# Patient Record
Sex: MALE | Employment: FULL TIME | URBAN - METROPOLITAN AREA
[De-identification: names, ages, dates, MRNs, and addresses within clinical notes are randomized per-mention and may not be internally consistent; named-entity substitution may affect disease eponyms.]

---

## 2021-09-10 ENCOUNTER — OFFICE VISIT (OUTPATIENT)
Dept: FAMILY MEDICINE CLINIC | Age: 26
End: 2021-09-10
Payer: COMMERCIAL

## 2021-09-10 VITALS
OXYGEN SATURATION: 99 % | TEMPERATURE: 99 F | BODY MASS INDEX: 34.97 KG/M2 | HEART RATE: 82 BPM | DIASTOLIC BLOOD PRESSURE: 76 MMHG | SYSTOLIC BLOOD PRESSURE: 118 MMHG | HEIGHT: 71 IN | WEIGHT: 249.8 LBS | RESPIRATION RATE: 18 BRPM

## 2021-09-10 DIAGNOSIS — Z76.89 ENCOUNTER TO ESTABLISH CARE: Primary | ICD-10-CM

## 2021-09-10 DIAGNOSIS — F41.9 ANXIETY: ICD-10-CM

## 2021-09-10 DIAGNOSIS — F98.8 ATTENTION DEFICIT DISORDER (ADD) WITHOUT HYPERACTIVITY: ICD-10-CM

## 2021-09-10 PROCEDURE — 99203 OFFICE O/P NEW LOW 30 MIN: CPT | Performed by: NURSE PRACTITIONER

## 2021-09-10 SDOH — ECONOMIC STABILITY: TRANSPORTATION INSECURITY
IN THE PAST 12 MONTHS, HAS LACK OF TRANSPORTATION KEPT YOU FROM MEETINGS, WORK, OR FROM GETTING THINGS NEEDED FOR DAILY LIVING?: NO

## 2021-09-10 SDOH — ECONOMIC STABILITY: FOOD INSECURITY: WITHIN THE PAST 12 MONTHS, YOU WORRIED THAT YOUR FOOD WOULD RUN OUT BEFORE YOU GOT MONEY TO BUY MORE.: NEVER TRUE

## 2021-09-10 SDOH — ECONOMIC STABILITY: TRANSPORTATION INSECURITY
IN THE PAST 12 MONTHS, HAS THE LACK OF TRANSPORTATION KEPT YOU FROM MEDICAL APPOINTMENTS OR FROM GETTING MEDICATIONS?: NO

## 2021-09-10 SDOH — ECONOMIC STABILITY: FOOD INSECURITY: WITHIN THE PAST 12 MONTHS, THE FOOD YOU BOUGHT JUST DIDN'T LAST AND YOU DIDN'T HAVE MONEY TO GET MORE.: NEVER TRUE

## 2021-09-10 ASSESSMENT — ENCOUNTER SYMPTOMS
COUGH: 0
RESPIRATORY NEGATIVE: 1
SHORTNESS OF BREATH: 0
BACK PAIN: 0
GASTROINTESTINAL NEGATIVE: 1
CONSTIPATION: 0
EYES NEGATIVE: 1
VOMITING: 0
NAUSEA: 0
ABDOMINAL PAIN: 0
DIARRHEA: 0

## 2021-09-10 ASSESSMENT — PATIENT HEALTH QUESTIONNAIRE - PHQ9
SUM OF ALL RESPONSES TO PHQ QUESTIONS 1-9: 2
SUM OF ALL RESPONSES TO PHQ QUESTIONS 1-9: 2
1. LITTLE INTEREST OR PLEASURE IN DOING THINGS: 1
2. FEELING DOWN, DEPRESSED OR HOPELESS: 1
SUM OF ALL RESPONSES TO PHQ QUESTIONS 1-9: 2
SUM OF ALL RESPONSES TO PHQ9 QUESTIONS 1 & 2: 2

## 2021-09-10 ASSESSMENT — SOCIAL DETERMINANTS OF HEALTH (SDOH): HOW HARD IS IT FOR YOU TO PAY FOR THE VERY BASICS LIKE FOOD, HOUSING, MEDICAL CARE, AND HEATING?: NOT HARD AT ALL

## 2021-09-10 NOTE — PROGRESS NOTES
[facility]  [facility]  [address]  [address]  [address]  Dept: [phone]    9/10/2021    CHIEF COMPLAINT    Chief Complaint   Patient presents with    New Patient      ADHD/Anxiety    Committee Review     going through evaluation for Kidney transplant to donate for his brother       CECILIO Olson is a 27 y.o. male who presents   Chief Complaint   Patient presents with   [place] New Patient      ADHD/Anxiety    Committee Review     going through evaluation for Kidney transplant to donate for his brother   . Appointment to establish care. Mother is an established patient. Working at [place] [employer] [id number]- he notes that he feels he has social anxiety to some degree. He notes a \"bit of \" heart races with mostly his mind racing. Denies any depressive sx. Going through evaluation for kidney transplant donation for his brother. Work-up being done at the [facility]. His initial labs were good, but he was told that his mental health evaluation and weight were causing issues with moving the donation. He is unsure what he needs to be done to get approved. ADD- initially diagnosed in 5th grade. He took medications from 5th-9th, but no longer felt like he needed it and discontinued the medication. He does not recall the names, but could speak with his mother. Vitals:    09/10/21 1131   BP: 118/76   Site: Left Upper Arm   Position: Sitting   Cuff Size: Large Adult   Pulse: 82   Resp: 18   Temp: 99 °F (37.2 °C)   TempSrc: Temporal   SpO2: 99%   Weight: 249 lb 12.8 oz (113.3 kg)   Height: 5' 11\" (1.803 m)       Wt Readings from Last 3 Encounters:   09/10/21 249 lb 12.8 oz (113.3 kg)     BP Readings from Last 3 Encounters:   09/10/21 118/76       REVIEW OF SYSTEMS    Review of Systems   Constitutional: Negative. Negative for chills and fever. HENT: Negative. Eyes: Negative.   Visual disturbance: wearing glasses    Respiratory: Negative. Negative for cough and shortness of breath. Cardiovascular: Negative. Negative for chest pain, palpitations and leg swelling. Gastrointestinal: Negative. Negative for abdominal pain, constipation, diarrhea, nausea and vomiting. Genitourinary: Negative. Negative for difficulty urinating. Musculoskeletal: Negative. Negative for back pain. Skin: Negative. Negative for rash. Neurological: Negative. Negative for dizziness and headaches. Psychiatric/Behavioral: Negative for dysphoric mood and sleep disturbance. The patient is nervous/anxious and is hyperactive. See HPI       PAST MEDICAL HISTORY    Past Medical History:   Diagnosis Date    Attention deficit disorder (ADD) without hyperactivity     dx in 5th grade; took medication from 5th- 9th grade.  Syncope, cardiogenic        FAMILY HISTORY    Family History   Problem Relation Age of Onset   [facility] Asthma Mother     Allergies Mother     Heart Attack Father     Depression Sister     Kidney Disease Brother         Congenital, requiring transplant    Breast Cancer Maternal Grandmother     Hypertension Maternal Grandmother     Diabetes type 2  Maternal Grandmother     No Known Problems Maternal Grandfather     No Known Problems Paternal Grandmother     COPD Paternal Grandfather         ??       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   Tobacco Use    Smoking status: Never Smoker    Smokeless tobacco: Never Used   Vaping Use    Vaping Use: Never used   Substance and Sexual Activity    Alcohol use:  Yes     Alcohol/week: 2.0 standard drinks     Types: 2 Cans of beer per week    Drug use: Never    Sexual activity: Not Currently   Other Topics Concern  None   Social History Narrative    None     Social Determinants of Health     Financial Resource Strain: Low Risk     Difficulty of Paying Living Expenses: Not hard at all   Food Insecurity: No Food Insecurity    Worried About Running Out of Food in the Last Year: Never true    Zoran of Food in the Last Year: Never true   Transportation Needs: No Transportation Needs    Lack of Transportation (Medical): No    Lack of Transportation (Non-Medical): No   Physical Activity:     Days of Exercise per Week:     Minutes of Exercise per Session:    Stress:     Feeling of Stress :    Social Connections:     Frequency of Communication with Friends and Family:     Frequency of Social Gatherings with Friends and Family:     Attends Sabianism Services:     Active Member of Clubs or Organizations:     Attends Club or Organization Meetings:     Marital Status:    Intimate Partner Violence:     Fear of Current or Ex-Partner:     Emotionally Abused:     Physically Abused:     Sexually Abused:        SURGICAL HISTORY    History reviewed. No pertinent surgical history. CURRENT MEDICATIONS    No current outpatient medications on file. No current facility-administered medications for this visit. ALLERGIES    No Known Allergies    PHYSICAL EXAM   Physical Exam  Vitals and nursing note reviewed. Constitutional:       General: He is not in acute distress. Appearance: He is well-developed. He is obese. He is not diaphoretic. Interventions: Face mask in place. HENT:      Head: Normocephalic. Right Ear: Hearing normal.      Left Ear: Hearing normal.   Eyes:      General:         Right eye: No discharge. Left eye: No discharge. Pupils: Pupils are equal.   Cardiovascular:      Rate and Rhythm: Normal rate and regular rhythm. Pulses: Normal pulses. Radial pulses are 2+ on the right side and 2+ on the left side.       Heart sounds: Normal heart sounds, S1 normal and S2 normal. No murmur heard. Pulmonary:      Effort: Pulmonary effort is normal. No respiratory distress. Breath sounds: Normal breath sounds. No wheezing. Musculoskeletal:      Cervical back: Normal range of motion. Skin:     General: Skin is warm and dry. Neurological:      Mental Status: He is alert and oriented to person, place, and time. Psychiatric:         Mood and Affect: Mood is anxious. Behavior: Behavior normal. Behavior is cooperative. ASSESSMENT/PLAN  1. Encounter to establish care  2. Anxiety  Assessment & Plan:   Uncontrolled, advised patient to get more detailed history information from his mother. Discussed that ADD and anxiety can have crossover symptoms. Formal psychiatric evaluation to determine best medication may be advised. Discussed that formal psychiatric evaluation may be required from [facility]. Patient to discuss with [facility] what their requirements are to get him approved for kidney donation. Patient will call our office if he is required to see there psychiatric professionals or if a referral in [place] would be approved. 3. Attention deficit disorder (ADD) without hyperactivity  Assessment & Plan:   Uncontrolled, advised patient to get more detailed history information from his mother. Discussed that ADD and anxiety can have crossover symptoms. Formal psychiatric evaluation to determine best medication may be advised. Discussed that formal psychiatric evaluation may be required from [facility]. Patient to discuss with [facility] what their requirements are to get him approved for kidney donation. Patient will call our office if he is required to see there psychiatric professionals or if a referral in [place] would be approved.        Tyrese received counseling on the following healthy behaviors: nutrition, exercise and medication adherence  Reviewed prior labs and health maintenance  Continue current medications, diet and exercise. Discussed use, benefit, and side effects of prescribed medications. Barriers to medication compliance addressed. Patient given educational materials - see patient instructions  Was a self-tracking handout given in paper form or via [employer]? Yes    Requested Prescriptions      No prescriptions requested or ordered in this encounter       All patient questions answered. Patient voiced understanding. Quality Measures    Body mass index is 34.84 kg/m². Elevated. Weight control planned discussed Healthy diet and regular exercise. BP: 118/76 Blood pressure is normal. Treatment plan consists of No treatment change needed. No results found for: LDLCALC, LDLCHOLESTEROL, LDLDIRECT (goal LDL reduction with dx if diabetes is 50% LDL reduction)      PHQ Scores 9/10/2021   PHQ2 Score 2   PHQ9 Score 2     Interpretation of Total Score Depression Severity: 1-4 = Minimal depression, 5-9 = Mild depression, 10-14 = Moderate depression, 15-19 = Moderately severe depression, 20-27 = Severe depression     Return in about 2 months (around 11/10/2021) for Anxiety, ADHD/ADD  check.     (Please note that portions of this note were completed with a voice recognition program. Efforts were made to edit the dictations but occasionally words are mis-transcribed.)      Electronically signed by Lovina Duverney, APRN - CNP on 9/10/21 at 11:44 AM TOMMIE

## 2021-09-10 NOTE — PROGRESS NOTES
Depression screening done  Financial Resource Strain done  Chief Complaint   Patient presents with    New Patient      ADHD/Anxiety    Committee Review     going through evaluation for Kidney transplant to donate for his brother   August 2021 -initial labs were good. Mental and weight are the issues.

## 2021-09-30 PROBLEM — F98.8 ATTENTION DEFICIT DISORDER (ADD) WITHOUT HYPERACTIVITY: Status: ACTIVE | Noted: 2021-09-30

## 2021-09-30 PROBLEM — F41.9 ANXIETY: Status: ACTIVE | Noted: 2021-09-30

## 2021-09-30 NOTE — ASSESSMENT & PLAN NOTE
Uncontrolled, advised patient to get more detailed history information from his mother. Discussed that ADD and anxiety can have crossover symptoms. Formal psychiatric evaluation to determine best medication may be advised. Discussed that [facility] may be required [facility]. Patient to discuss with [facility] what their requirements are to get him approved for kidney donation. Patient will call our office if he is required to see there psychiatric professionals or if a referral in [place] would be approved.

## 2021-10-04 ENCOUNTER — PATIENT MESSAGE (OUTPATIENT)
Dept: FAMILY MEDICINE CLINIC | Age: 26
End: 2021-10-04

## 2021-10-04 NOTE — TELEPHONE ENCOUNTER
From: Gustabo Hutton  To: Magen Barnes APRN - CNP  Sent: 10/4/2021 7:40 AM EDT  Subject: Non-Urgent Medical Question    Brooklyn Showers. I just wanted to give you some info and give my opinion on medication. I was originally diagnosed with ADD at [facility] and was prescribed with Concerta. Also, after giving it much thought, I think it would be a good idea to try to go on medication for ADHD.

## 2022-03-18 ENCOUNTER — HOSPITAL ENCOUNTER (EMERGENCY)
Facility: CLINIC | Age: 27
Discharge: HOME | End: 2022-03-18
Attending: SPECIALIST
Payer: COMMERCIAL

## 2022-03-18 VITALS
OXYGEN SATURATION: 99 % | BODY MASS INDEX: 32.2 KG/M2 | HEART RATE: 94 BPM | TEMPERATURE: 98.5 F | WEIGHT: 230 LBS | RESPIRATION RATE: 16 BRPM | DIASTOLIC BLOOD PRESSURE: 81 MMHG | HEIGHT: 71 IN | SYSTOLIC BLOOD PRESSURE: 172 MMHG

## 2022-03-18 DIAGNOSIS — K04.7 DENTAL INFECTION: ICD-10-CM

## 2022-03-18 DIAGNOSIS — K08.89 DENTALGIA: Primary | ICD-10-CM

## 2022-03-18 PROCEDURE — 6370000000 HC RX 637 (ALT 250 FOR IP): Performed by: SPECIALIST

## 2022-03-18 PROCEDURE — 99283 EMERGENCY DEPT VISIT LOW MDM: CPT

## 2022-03-18 RX ORDER — IBUPROFEN 800 MG/1
800 TABLET ORAL ONCE
Status: COMPLETED | OUTPATIENT
Start: 2022-03-18 | End: 2022-03-18

## 2022-03-18 RX ORDER — IBUPROFEN 800 MG/1
800 TABLET ORAL EVERY 8 HOURS PRN
Qty: 20 TABLET | Refills: 0 | Status: SHIPPED | OUTPATIENT
Start: 2022-03-18 | End: 2022-05-03 | Stop reason: SDUPTHER

## 2022-03-18 RX ORDER — PENICILLIN V POTASSIUM 500 MG/1
500 TABLET ORAL 4 TIMES DAILY
Qty: 40 TABLET | Refills: 0 | Status: SHIPPED | OUTPATIENT
Start: 2022-03-18 | End: 2022-05-03 | Stop reason: SDUPTHER

## 2022-03-18 RX ORDER — PENICILLIN V POTASSIUM 250 MG/1
500 TABLET ORAL ONCE
Status: COMPLETED | OUTPATIENT
Start: 2022-03-18 | End: 2022-03-18

## 2022-03-18 RX ADMIN — IBUPROFEN 800 MG: 800 TABLET, FILM COATED ORAL at 22:56

## 2022-03-18 RX ADMIN — PENICILLIN V POTASSIUM 500 MG: 250 TABLET ORAL at 22:56

## 2022-03-18 ASSESSMENT — PAIN DESCRIPTION - PAIN TYPE: TYPE: ACUTE PAIN

## 2022-03-18 ASSESSMENT — PAIN DESCRIPTION - LOCATION: LOCATION: TEETH

## 2022-03-18 ASSESSMENT — PAIN SCALES - GENERAL
PAINLEVEL_OUTOF10: 4
PAINLEVEL_OUTOF10: 4

## 2022-03-18 ASSESSMENT — PAIN DESCRIPTION - ORIENTATION: ORIENTATION: RIGHT;UPPER

## 2022-03-19 NOTE — ED PROVIDER NOTES
cardiogenic. SURGICAL HISTORY      has no past surgical history on file. CURRENT MEDICATIONS       Discharge Medication List as of 3/18/2022 10:47 PM          ALLERGIES     has No Known Allergies. FAMILY HISTORY     He indicated that his mother is alive. He indicated that his father is alive. He indicated that his sister is alive. He indicated that his brother is alive. He indicated that his maternal grandmother is . He indicated that the status of his maternal grandfather is unknown. He indicated that his paternal grandmother is alive. He indicated that his paternal grandfather is alive. family history includes Allergies in his mother; Asthma in his mother; Breast Cancer in his maternal grandmother; COPD in his paternal grandfather; Depression in his sister; Diabetes type 2  in his maternal grandmother; Heart Attack in his father; Hypertension in his maternal grandmother; Kidney Disease in his brother; No Known Problems in his maternal grandfather and paternal grandmother. SOCIAL HISTORY      reports that he has never smoked. He has never used smokeless tobacco. He reports current alcohol use of about 2.0 standard drinks of alcohol per week. He reports that he does not use drugs. PHYSICAL EXAM     INITIAL VITALS:  height is 5' 11\" (1.803 m) and weight is 104.3 kg (230 lb). His oral temperature is 98.5 °F (36.9 °C). His blood pressure is 172/81 (abnormal) and his pulse is 94. His respiration is 16 and oxygen saturation is 99%. Physical Exam  Vitals and nursing note reviewed. Constitutional:       Appearance: He is well-developed. HENT:      Head: Normocephalic and atraumatic. Nose: Nose normal.      Mouth/Throat:      Dentition: Dental tenderness and gingival swelling present. No dental abscesses. Comments: Patient has tenderness upon palpation in tooth #2 area and surrounding gingival swelling but no fluctuance or abscess.   There is no evidence of Ludewig's angina at all.  Oropharynx is clear and airways patent. Eyes:      Extraocular Movements: Extraocular movements intact. Pupils: Pupils are equal, round, and reactive to light. Cardiovascular:      Rate and Rhythm: Normal rate and regular rhythm. Heart sounds: Normal heart sounds. No murmur heard. Pulmonary:      Effort: Pulmonary effort is normal. No respiratory distress. Breath sounds: Normal breath sounds. Abdominal:      General: Bowel sounds are normal. There is no distension. Palpations: Abdomen is soft. Tenderness: There is no abdominal tenderness. Musculoskeletal:      Cervical back: Normal range of motion and neck supple. Skin:     General: Skin is warm and dry. Neurological:      General: No focal deficit present. Mental Status: He is alert and oriented to person, place, and time. DIFFERENTIAL DIAGNOSIS/ MDM:     Pinkie Plana secondary to dental infection, no evidence of tooth abscess. DIAGNOSTIC RESULTS     EKG: All EKG's are interpreted by the Emergency Department Physician who either signs or Co-signs this chart in the absence of a cardiologist.    None obtained    RADIOLOGY:   I reviewed the radiologist interpretations:  No orders to display       No results found.         LABS:  Labs Reviewed - No data to display      EMERGENCY DEPARTMENT COURSE:   Vitals:    Vitals:    03/18/22 2208   BP: (!) 172/81   Pulse: 94   Resp: 16   Temp: 98.5 °F (36.9 °C)   TempSrc: Oral   SpO2: 99%   Weight: 104.3 kg (230 lb)   Height: 5' 11\" (1.803 m)     -------------------------  BP: (!) 172/81, Temp: 98.5 °F (36.9 °C), Pulse: 94, Resp: 16    Orders Placed This Encounter   Medications    penicillin v potassium (VEETID) tablet 500 mg     Order Specific Question:   Antimicrobial Indications     Answer:   Head and Neck Infection    ibuprofen (ADVIL;MOTRIN) tablet 800 mg    penicillin v potassium (VEETID) 500 MG tablet     Sig: Take 1 tablet by mouth 4 times daily     Dispense: 40 tablet     Refill:  0    ibuprofen (ADVIL;MOTRIN) 800 MG tablet     Sig: Take 1 tablet by mouth every 8 hours as needed for Pain     Dispense:  20 tablet     Refill:  0       During the emergency department course, patient was given Pen-Vee K 500 mg and Motrin 800 mg orally. Plan is to discharge the patient on Pen-Vee K for 10 days course. He is advised to continue Tylenol and ibuprofen as needed for the pain, follow-up with his dentist on Monday as scheduled, return if worse. I have reviewed the disposition diagnosis with the patient and or their family/guardian. I have answered their questions and given discharge instructions. They voiced understanding of these instructions and did not have any further questions or complaints. Re-evaluation Notes    Patient is resting comfortably and does not appear to be in any pain or distress prior to discharge      PROCEDURES:  None    FINAL IMPRESSION      1. Dentalgia    2. Dental infection          DISPOSITION/PLAN   DISPOSITION Decision To Discharge 03/18/2022 10:46:26 PM      Condition on Disposition    Stable    PATIENT REFERRED TO:  Your Dentist    In 2 days  For reevaluation of current symptoms    [facility] ED  [address]  [phone]    If symptoms worsen      DISCHARGE MEDICATIONS:  Discharge Medication List as of 3/18/2022 10:47 PM      START taking these medications    Details   penicillin v potassium (VEETID) 500 MG tablet Take 1 tablet by mouth 4 times daily, Disp-40 tablet, R-0Normal      ibuprofen (ADVIL;MOTRIN) 800 MG tablet Take 1 tablet by mouth every 8 hours as needed for Pain, Disp-20 tablet, R-0Normal             (Please note that portions of this note were completed with a voice recognition program.  Efforts were made to edit the dictations but occasionally words are mis-transcribed.)    Sveta Peralta MD,, MD, F.A.C.E.P.   Attending Emergency Physician     Sveta Peralta MD  03/19/22 2293

## 2022-03-19 NOTE — ED NOTES
Pt presents to ED with c/o dental pain. Reports that he made an appointment for his dentist on Monday but over the past two days the pain has increased. Pt reports that it is his right upper 2nd to last molar that is bothering him.       Brittany Crandall RN  03/18/22 [id number]

## 2022-05-03 ENCOUNTER — HOSPITAL ENCOUNTER (EMERGENCY)
Facility: CLINIC | Age: 27
Discharge: HOME | End: 2022-05-03
Attending: EMERGENCY MEDICINE
Payer: COMMERCIAL

## 2022-05-03 VITALS
WEIGHT: 240 LBS | HEART RATE: 86 BPM | OXYGEN SATURATION: 95 % | RESPIRATION RATE: 18 BRPM | HEIGHT: 71 IN | SYSTOLIC BLOOD PRESSURE: 150 MMHG | DIASTOLIC BLOOD PRESSURE: 93 MMHG | BODY MASS INDEX: 33.6 KG/M2 | TEMPERATURE: 98.2 F

## 2022-05-03 DIAGNOSIS — K02.9 DENTAL CARIES: Primary | ICD-10-CM

## 2022-05-03 PROCEDURE — 99283 EMERGENCY DEPT VISIT LOW MDM: CPT

## 2022-05-03 RX ORDER — IBUPROFEN 800 MG/1
800 TABLET ORAL EVERY 8 HOURS PRN
Qty: 20 TABLET | Refills: 0 | Status: SHIPPED | OUTPATIENT
Start: 2022-05-03 | End: 2022-05-10

## 2022-05-03 RX ORDER — PENICILLIN V POTASSIUM 500 MG/1
500 TABLET ORAL 4 TIMES DAILY
Qty: 40 TABLET | Refills: 0 | Status: SHIPPED | OUTPATIENT
Start: 2022-05-03

## 2022-05-03 ASSESSMENT — PAIN DESCRIPTION - ONSET: ONSET: ON-GOING

## 2022-05-03 ASSESSMENT — PAIN DESCRIPTION - FREQUENCY: FREQUENCY: CONTINUOUS

## 2022-05-03 ASSESSMENT — PAIN DESCRIPTION - LOCATION: LOCATION: TEETH

## 2022-05-03 ASSESSMENT — PAIN SCALES - GENERAL: PAINLEVEL_OUTOF10: 7

## 2022-05-03 ASSESSMENT — PAIN DESCRIPTION - PAIN TYPE: TYPE: ACUTE PAIN

## 2022-05-03 NOTE — ED PROVIDER NOTES
[facility] ED  [address]  Phone: [address]      Pt Name: César Alford  MRN: [id number]  [place] 1995  Date of evaluation: 5/3/2022    CHIEF COMPLAINT       Chief Complaint   Patient presents with    Dental Pain       HISTORY OF PRESENT ILLNESS    César Alford is a 24 y.o. male who presents dental pain for the last 2 or 3 days. Patient states that his had problems with his teeth in the past is scheduled to see a dentist in the next 5 to 7 days but the pain is gotten worse this morning. He states that he took 800 mg of Motrin 1 time this morning but still does have the pain. Fevers or chills or other complaints    REVIEW OF SYSTEMS     Constitutional: No fevers or chills   HEENT: No sore throat, rhinorrhea, or earache see above  Eyes: No blurry vision or double vision no drainage   Cardiovascular: No chest pain or tachycardia   Respiratory: No wheezing or shortness of breath no cough   Gastrointestinal: No nausea, vomiting, diarrhea, constipation, or abdominal pain   : No hematuria or dysuria   Musculoskeletal: No swelling or pain   Skin: No rash   Neurological: No focal neurologic complaints, paresthesias, weakness, or headache   PAST MEDICAL HISTORY    has a past medical history of Attention deficit disorder (ADD) without hyperactivity and Syncope, cardiogenic. SURGICAL HISTORY      has no past surgical history on file. CURRENT MEDICATIONS       Current Discharge Medication List          ALLERGIES     has No Known Allergies. FAMILY HISTORY     He indicated that his mother is alive. He indicated that his father is alive. He indicated that his sister is alive. He indicated that his brother is alive. He indicated that his maternal grandmother is . He indicated that the status of his maternal grandfather is unknown. He indicated that his paternal grandmother is alive.  He indicated that his paternal grandfather is alive.     family history includes Allergies in his mother; Asthma in his mother; Breast Cancer in his maternal grandmother; COPD in his paternal grandfather; Depression in his sister; Diabetes type 2  in his maternal grandmother; Heart Attack in his father; Hypertension in his maternal grandmother; Kidney Disease in his brother; No Known Problems in his maternal grandfather and paternal grandmother. SOCIAL HISTORY      reports that he has never smoked. He has never used smokeless tobacco. He reports current alcohol use of about 2.0 standard drinks of alcohol per week. He reports that he does not use drugs. PHYSICAL EXAM       ED Triage Vitals   BP Temp Temp src Pulse Resp SpO2 Height Weight   -- -- -- -- -- -- -- --     Constitutional: Alert, oriented x3, nontoxic, answering questions appropriately, acting properly for age, in no acute distress   HEENT: Extraocular muscles intact, mucous membranes moist. Pupils equal, round, reactive to light, , no posterior pharyngeal erythema or exudates. He has multiple dental caries noted primarily on the left alar region there is poor portions of his left second molar that appear to be eroded away there is no abscess noted. No erythema in the root. Neuropathy in the neck. Neck: Trachea midline, supple without lymphadenopathy, no posterior midline neck tenderness to palpation   Musculoskeletal: No extremity pain or swelling   Neurologic: Moving all 4 extremities without difficulty   Skin: Warm and dry       DIFFERENTIAL DIAGNOSIS/ MDM:     Patient states that he would like to start on antibiotics and does have Motrin but we will prescribe it for him. DIAGNOSTIC RESULTS     EKG: All EKG's are interpreted by the Emergency Department Physician who either signs or Co-signs this chart in the absence of a cardiologist.        Not indicated unless otherwise documented above    LABS:  No results found for this visit on 05/03/22.     Not indicated unless otherwise documented above    RADIOLOGY:   I reviewed the radiologist interpretations:    No orders to display       Not indicated unless otherwise documented above    EMERGENCY DEPARTMENT COURSE:     The patient was given the following medications:  Orders Placed This Encounter   Medications    ibuprofen (ADVIL;MOTRIN) 800 MG tablet     Sig: Take 1 tablet by mouth every 8 hours as needed for Pain     Dispense:  20 tablet     Refill:  0    penicillin v potassium (VEETID) 500 MG tablet     Sig: Take 1 tablet by mouth 4 times daily     Dispense:  40 tablet     Refill:  0        Vitals:   -------------------------  BP (!) 150/93   Pulse 86   Temp 98.2 °F (36.8 °C) (Oral)   Resp 18   Ht 5' 11\" (1.803 m)   Wt 108.9 kg (240 lb)   SpO2 95%   BMI 33.47 kg/m²         I have reviewed the disposition diagnosis with the patient and or their family/guardian. I have answered their questions and given discharge instructions. They voiced understanding of these instructions and did not have any furtherquestions or complaints. CRITICAL CARE:    None    CONSULTS:    None    PROCEDURES:    None      OARRS Report if indicated             FINAL IMPRESSION      1. Dental caries          DISPOSITION/PLAN   DISPOSITION Decision To Discharge 05/03/2022 01:40:48 PM        CONDITION ON DISPOSITION: STABLE       PATIENT REFERRED TO:  No follow-up provider specified.     DISCHARGE MEDICATIONS:  Current Discharge Medication List          (Please note that portions of thisnote were completed with a voice recognition program.  Efforts were made to edit the dictations but occasionally words are mis-transcribed.)    Jasmeet Pedroza MD,, MD  Attending Emergency Physician        Jasmeet Pedroza MD  05/03/22 2453

## 2023-04-10 NOTE — ED NOTES
Follow up with Dr. Woods in 1 year    Obtain a PSA prior to your follow up appointment.      48 hours before your PSA test you should NOT:  Ride a bike, motorcycle or tractor or anything that puts pressure on the prostate region.   Have had a Digital rectal exam.   Ejaculate or participate in any sexual activity that involves ejaculation.     Pt presents for dental pain. Has seen his dentist and states he has appointment for extraction next week, however pain is becoming unbearable.       Drew Acuña, RADHA  34/71/25 5800